# Patient Record
Sex: FEMALE | Race: WHITE | Employment: FULL TIME | URBAN - METROPOLITAN AREA
[De-identification: names, ages, dates, MRNs, and addresses within clinical notes are randomized per-mention and may not be internally consistent; named-entity substitution may affect disease eponyms.]

---

## 2018-08-21 PROBLEM — N92.1 BREAKTHROUGH BLEEDING ON BIRTH CONTROL PILLS: Status: ACTIVE | Noted: 2018-08-21

## 2018-08-21 PROBLEM — N94.10 DYSPAREUNIA, FEMALE: Status: ACTIVE | Noted: 2018-08-21

## 2018-08-21 PROCEDURE — 87660 TRICHOMONAS VAGIN DIR PROBE: CPT | Performed by: NURSE PRACTITIONER

## 2018-08-21 PROCEDURE — 87480 CANDIDA DNA DIR PROBE: CPT | Performed by: NURSE PRACTITIONER

## 2018-08-21 PROCEDURE — 87510 GARDNER VAG DNA DIR PROBE: CPT | Performed by: NURSE PRACTITIONER

## 2018-12-22 PROCEDURE — 88175 CYTOPATH C/V AUTO FLUID REDO: CPT | Performed by: NURSE PRACTITIONER

## 2019-04-22 PROCEDURE — 88175 CYTOPATH C/V AUTO FLUID REDO: CPT | Performed by: NURSE PRACTITIONER

## 2019-04-29 PROBLEM — Z87.42 HISTORY OF ABNORMAL CERVICAL PAPANICOLAOU SMEAR: Status: ACTIVE | Noted: 2019-04-29

## 2019-04-29 PROBLEM — R87.612 PAPANICOLAOU SMEAR OF CERVIX WITH LOW GRADE SQUAMOUS INTRAEPITHELIAL LESION (LGSIL): Status: ACTIVE | Noted: 2019-04-29

## 2019-04-29 PROCEDURE — 88305 TISSUE EXAM BY PATHOLOGIST: CPT | Performed by: NURSE PRACTITIONER

## 2019-12-07 PROBLEM — Z11.3 SCREEN FOR STD (SEXUALLY TRANSMITTED DISEASE): Status: ACTIVE | Noted: 2019-12-07

## 2019-12-07 PROBLEM — Z86.19 HISTORY OF CHLAMYDIA: Status: ACTIVE | Noted: 2019-12-07

## 2024-08-27 ENCOUNTER — OFFICE VISIT (OUTPATIENT)
Dept: ENDOCRINOLOGY CLINIC | Facility: CLINIC | Age: 29
End: 2024-08-27
Payer: COMMERCIAL

## 2024-08-27 VITALS
SYSTOLIC BLOOD PRESSURE: 123 MMHG | HEIGHT: 63 IN | HEART RATE: 69 BPM | BODY MASS INDEX: 35.44 KG/M2 | WEIGHT: 200 LBS | DIASTOLIC BLOOD PRESSURE: 85 MMHG

## 2024-08-27 DIAGNOSIS — L68.9 EXCESS BODY AND FACIAL HAIR: Primary | ICD-10-CM

## 2024-08-27 DIAGNOSIS — Z87.42 HISTORY OF IRREGULAR MENSTRUAL CYCLES: ICD-10-CM

## 2024-08-27 DIAGNOSIS — Z13.1 DIABETES MELLITUS SCREENING: ICD-10-CM

## 2024-08-27 DIAGNOSIS — E78.5 DYSLIPIDEMIA: ICD-10-CM

## 2024-08-27 PROCEDURE — 99204 OFFICE O/P NEW MOD 45 MIN: CPT | Performed by: INTERNAL MEDICINE

## 2024-08-27 NOTE — H&P
New Patient Evaluation - History and Physical    CONSULT - Reason for Visit:  irregular cycles, excess facial hair  Requesting Physician: Self referral    CHIEF COMPLAINT:    Chief Complaint   Patient presents with    Consult     Pt has PCOS as a teenager. Having a lot of issues with weight.         HISTORY OF PRESENT ILLNESS:   Jessica Arias is a 29 year old female who presents for evaluation of PCOS.     She was diagnosed around age 16   Presenting symptoms: Irregular cycles, excess facial gain  She was started on birth control  Cycles are regular   DLMP: mid August 2024  Still reports excess facial hair, has to shave daily  Sexual activity: not at this time      Labs in the past:   TSH normal      PAST MEDICAL HISTORY:   Past Medical History:    Chlamydial infection    KIMMIE I (cervical intraepithelial neoplasia I)    Female hirsutism    LGSIL on Pap smear of cervix       PAST SURGICAL HISTORY:   Past Surgical History:   Procedure Laterality Date    Appendectomy  2007    Colposcopy,bx cervix/endocerv curr  04/29/2019    CIN1    Cryocautery of cervix  05/13/2019       CURRENT MEDICATIONS:    Current Outpatient Medications   Medication Sig Dispense Refill    valACYclovir 1 G Oral Tab Take 1 tablet (1,000 mg total) by mouth every 12 (twelve) hours. 30 tablet 0    Drospirenone-Ethinyl Estradiol (GIANVI) 3-0.02 MG Oral Tab Take 1 tablet by mouth daily. 84 tablet 3    acyclovir 5 % External Ointment Apply 1 Application topically every 3 (three) hours. 1 Tube 3       ALLERGIES:  No Known Allergies    SOCIAL HISTORY:    Social History     Socioeconomic History    Marital status: Single   Tobacco Use    Smoking status: Never    Smokeless tobacco: Never   Vaping Use    Vaping status: Never Used   Substance and Sexual Activity    Alcohol use: Yes     Comment: social    Drug use: Never    Sexual activity: Yes     Partners: Male     Birth control/protection: Pill       FAMILY HISTORY:   Family History   Problem Relation Age  of Onset    Stroke Paternal Grandfather     Ovarian Cancer Paternal Grandmother        ASSESSMENTS:     REVIEW OF SYSTEMS:  Constitutional: Negative for: weight change, fever, fatigue, cold/heat intolerance  Eyes: Negative for:  Visual changes, proptosis, blurring  ENT: Negative for:  dysphagia, neck swelling, dysphonia  Respiratory: Negative for:  dyspnea, cough  Cardiovascular: Negative for:  chest pain, palpitations, orthopnea  GI: Negative for:  abdominal pain, nausea, vomiting, diarrhea, constipation, bleeding  Neurology: Negative for: headache, numbness, weakness  Genito-Urinary: Negative for: dysuria, frequency  Psychiatric: Negative for:  depression, anxiety  Hematology/Lymphatics: Negative for: bruising, lower extremity edema  Endocrine: Negative for: polyuria, polydypsia  Skin: Negative for: rash, blister, cellulitis,      PHYSICAL EXAM:   Vitals:    08/27/24 1553   BP: 123/85   Pulse: 69   Weight: 200 lb (90.7 kg)   Height: 5' 3\" (1.6 m)     BMI: Body mass index is 35.43 kg/m².         General Appearance:  alert, well developed, in no acute distress  Head: Atraumatic  Eyes:  normal conjunctivae, sclera., normal sclera and normal pupils  Throat/Neck: normal sound to voice. Normal hearing, normal speech, no significant thyroid enlargement palpated  Respiratory:  Speaking in full sentences, non-labored. no increased work of breathing, no audible wheezing    Neuro: motor grossly intact, moving all extremities without difficulty  Psychiatric:  oriented to time, self, and place  Extremities: no obvious extremity swelling        DATA:     Pertinent data reviewed      ASSESSMENT AND PLAN:      Patient is a 29 year old female with   Irregular cycles  Excess facial hair  PCOS is the most common hormonal disorder among young women. Its diagnosis is based on the conjunction of two out of the three following criteria: oligo- or amenorrhea, hyperandrogenism (clinical evidence of excessive androgens) or  hyperandrogenemia (biochemical evidence of excessive levels of androgens) and polycystic ovaries shown in pelvic or transvaginal ultrasound images.      - Not on any drugs implicated in hirsutism including exogenous steroids and androgens, phenytoin, minoxidil, valproate  - Other family members including mother and sisters don’t have excess body hair  - Has normal TSH levels  - Will check PRL, 7-8 am  cortisol, TT and DHEAS levels    PLAN:  1. Hirsutism  We discussed spironolactone  Side effects of therapy include gynecomastia ( which can be painful) and electrolyte imbalance (potassium) and renal dysfunction discussed.   Will start after blood work in complete  Will check CMP after 5-7 days of starting treatment and if normal, at regular intervals after that.      2. Menstrual cycles  - Will c/w birth control  - Weight loss reviewed    3. BMI > 35   She states that she has been working on lifestyle changes for many years  She counts her macros and calories  She exercises multiple days a week   We also discussed weight loss medications, wegovy was not approved by insurance  We discussed phentermine - topiramate : she will like to think about this    4. Cardio metabolic risk profile  - BP is normal  - LDL was over 100in the past, will get a fasting lipid panel    Encouraged to follow a low carb, low fat diet and exercise daily   Further management will be based on results      Orders Placed This Encounter   Procedures    Cortisol    Prolactin    Dehydroepiandrosterone Sulfate    Testosterone Total    Lipid Panel [E]    Hemoglobin A1C [E]         8/27/2024  Dolly Lewis MD        Patient verbalized a complete  understanding of all of the above and did not have any further questions.

## 2024-08-30 ENCOUNTER — TELEPHONE (OUTPATIENT)
Dept: ENDOCRINOLOGY CLINIC | Facility: CLINIC | Age: 29
End: 2024-08-30

## 2024-08-30 NOTE — TELEPHONE ENCOUNTER
Patient states she completed blood work with [facility] and it is being sent to the office and would like to speak to RN about this please call

## 2024-09-06 NOTE — TELEPHONE ENCOUNTER
Unable to locate labs results. Notified patient that we do not have the results. She is in communication with [facility] and will try to get the results sent to us.

## 2024-09-11 ENCOUNTER — PATIENT MESSAGE (OUTPATIENT)
Dept: ENDOCRINOLOGY CLINIC | Facility: CLINIC | Age: 29
End: 2024-09-11

## 2024-09-11 ENCOUNTER — TELEPHONE (OUTPATIENT)
Dept: ENDOCRINOLOGY CLINIC | Facility: CLINIC | Age: 29
End: 2024-09-11

## 2024-09-11 DIAGNOSIS — R63.5 WEIGHT GAIN: ICD-10-CM

## 2024-09-11 DIAGNOSIS — E28.2 PCOS (POLYCYSTIC OVARIAN SYNDROME): Primary | ICD-10-CM

## 2024-09-11 DIAGNOSIS — Z13.9 SCREENING FOR CONDITION: ICD-10-CM

## 2024-09-11 NOTE — TELEPHONE ENCOUNTER
From: Jessica Arias  To: Dolly Lewis  Sent: 9/11/2024 11:13 AM CDT  Subject: Test Results Follow Up    Hi there,     There seems to be a disconnect between the hospital I got my bloodwork done at and your office, as the results have not been sent over in almost 2 weeks. Attached are screenshots of the results I received through and other hospital’s portal. If you could please review the bloodwork and let me know next best steps, that would be great!     I am also looking for a referral letter for a nutritionist to help manage my PCOS, and I would need that referral for insurance to cover the cost of appointments. If you could help, that’d also be wonderful!     Thanks so much !

## 2024-09-11 NOTE — TELEPHONE ENCOUNTER
Patient is calling to request a recommendation and referral for Nutritionist for PCOS.  Please call

## 2024-09-12 RX ORDER — SPIRONOLACTONE 50 MG/1
50 TABLET, FILM COATED ORAL DAILY
Qty: 90 TABLET | Refills: 0 | Status: SHIPPED | OUTPATIENT
Start: 2024-09-12

## 2024-09-16 RX ORDER — TOPIRAMATE 25 MG/1
25 TABLET, FILM COATED ORAL DAILY
Qty: 90 TABLET | Refills: 0 | Status: SHIPPED | OUTPATIENT
Start: 2024-09-16

## 2024-09-16 RX ORDER — PHENTERMINE HYDROCHLORIDE 15 MG/1
15 CAPSULE ORAL EVERY MORNING
Qty: 90 CAPSULE | Refills: 0 | Status: SHIPPED | OUTPATIENT
Start: 2024-09-16

## 2024-10-02 ENCOUNTER — PATIENT MESSAGE (OUTPATIENT)
Dept: ENDOCRINOLOGY CLINIC | Facility: CLINIC | Age: 29
End: 2024-10-02

## 2024-10-03 NOTE — TELEPHONE ENCOUNTER
From: Jessica Arias  To: Dolly Lewis  Sent: 10/2/2024 3:14 PM CDT  Subject: Nutritionist Referral    Hi there,    Can you please provide me with a referral to see a nutritionist for my PCOS at your earliest convenience.     Thanks!

## 2024-10-09 ENCOUNTER — HOSPITAL ENCOUNTER (OUTPATIENT)
Dept: ULTRASOUND IMAGING | Age: 29
Discharge: HOME | End: 2024-10-09
Attending: INTERNAL MEDICINE
Payer: COMMERCIAL

## 2024-10-09 DIAGNOSIS — E28.2 PCOS (POLYCYSTIC OVARIAN SYNDROME): ICD-10-CM

## 2024-10-09 PROCEDURE — 76856 US EXAM PELVIC COMPLETE: CPT | Performed by: INTERNAL MEDICINE

## 2024-10-09 PROCEDURE — 76830 TRANSVAGINAL US NON-OB: CPT | Performed by: INTERNAL MEDICINE

## 2024-10-10 ENCOUNTER — PATIENT MESSAGE (OUTPATIENT)
Dept: ENDOCRINOLOGY CLINIC | Facility: CLINIC | Age: 29
End: 2024-10-10

## 2024-12-03 RX ORDER — SPIRONOLACTONE 50 MG/1
50 TABLET, FILM COATED ORAL DAILY
Qty: 90 TABLET | Refills: 0 | Status: SHIPPED | OUTPATIENT
Start: 2024-12-03

## 2024-12-03 NOTE — TELEPHONE ENCOUNTER
Endocrine Refill protocol for oral antihypertensive medications    Protocol Criteria:  FAILED  Reason: No labs completed in required time frame     If all below requirements are met, send a 90-day supply with 1 refill per provider protocol.    Verify appointment with Endocrinology completed in the last 6 months or scheduled in the next 3 months.  Verify BMP or CMP completed in the last 12 months   Verify last GFR result is greater than or equal to 50     Last completed office visit:8/27/2024 Dolly Lewis MD   Next scheduled Follow up:   Future Appointments   Date Time Provider Department Center   12/13/2024  2:15 PM Dolly Lewis MD [facility]      Last BMP or CMP completion date:No results found for: \"GFRAA\", \"GFRNAA\", \"EGFRCR\"

## 2024-12-17 ENCOUNTER — OFFICE VISIT (OUTPATIENT)
Dept: ENDOCRINOLOGY CLINIC | Facility: CLINIC | Age: 29
End: 2024-12-17

## 2024-12-17 VITALS
SYSTOLIC BLOOD PRESSURE: 113 MMHG | HEIGHT: 63 IN | HEART RATE: 78 BPM | WEIGHT: 195.38 LBS | BODY MASS INDEX: 34.62 KG/M2 | DIASTOLIC BLOOD PRESSURE: 60 MMHG

## 2024-12-17 DIAGNOSIS — E28.2 PCOS (POLYCYSTIC OVARIAN SYNDROME): ICD-10-CM

## 2024-12-17 DIAGNOSIS — L68.0 FEMALE HIRSUTISM: ICD-10-CM

## 2024-12-17 PROCEDURE — 99214 OFFICE O/P EST MOD 30 MIN: CPT | Performed by: INTERNAL MEDICINE

## 2024-12-17 RX ORDER — SPIRONOLACTONE 50 MG/1
50 TABLET, FILM COATED ORAL DAILY
Qty: 90 TABLET | Refills: 0 | Status: SHIPPED | OUTPATIENT
Start: 2024-12-17

## 2024-12-17 RX ORDER — METFORMIN HYDROCHLORIDE 500 MG/1
TABLET, EXTENDED RELEASE ORAL
Qty: 53 TABLET | Refills: 0 | Status: SHIPPED | OUTPATIENT
Start: 2024-12-17 | End: 2025-01-16

## 2024-12-17 RX ORDER — PHENTERMINE HYDROCHLORIDE 15 MG/1
15 CAPSULE ORAL EVERY MORNING
Qty: 90 CAPSULE | Refills: 0 | Status: CANCELLED | OUTPATIENT
Start: 2024-12-17

## 2024-12-17 NOTE — PROGRESS NOTES
FU VISIT     CHIEF COMPLAINT:    Elevated BMI   Excess facial hair  PCOS    HISTORY OF PRESENT ILLNESS:   Jessica Arias is a 29 year old female who presents for evaluation of dyslipidemia, excess facial hair and elevated BMI     She was diagnosed around age 16   Presenting symptoms: Irregular cycles, excess facial hair  She was started on birth control  Cycles are regular now  Still reports excess facial hair, has to shave daily--> Started on SPRL , helps some, if BMP is normal will like to go up on the dose   Sexual activity: not at this time      Labs in the past:   TSH normal    She started phentermine but has not been taking the topamax, was concerned about SE  She does not think this has helped  her loose weight   She continues to follow a healthy lifestyle      PAST MEDICAL HISTORY:   Past Medical History:    Chlamydial infection    KIMMIE I (cervical intraepithelial neoplasia I)    Female hirsutism    LGSIL on Pap smear of cervix       PAST SURGICAL HISTORY:   Past Surgical History:   Procedure Laterality Date    Appendectomy  2007    Colposcopy,bx cervix/endocerv curr  04/29/2019    CIN1    Cryocautery of cervix  05/13/2019       CURRENT MEDICATIONS:    Current Outpatient Medications   Medication Sig Dispense Refill    spironolactone 50 MG Oral Tab Take 1 tablet (50 mg total) by mouth daily. 90 tablet 0    metFORMIN  MG Oral Tablet 24 Hr Take 1 tablet (500 mg total) by mouth daily with breakfast for 7 days, THEN 1 tablet (500 mg total) 2 (two) times daily with meals for 23 days. 53 tablet 0    valACYclovir 1 G Oral Tab Take 1 tablet (1,000 mg total) by mouth every 12 (twelve) hours. 30 tablet 0    Drospirenone-Ethinyl Estradiol (GIANVI) 3-0.02 MG Oral Tab Take 1 tablet by mouth daily. 84 tablet 3    acyclovir 5 % External Ointment Apply 1 Application topically every 3 (three) hours. 1 Tube 3       ALLERGIES:  No Known Allergies    SOCIAL HISTORY:    Social History     Socioeconomic History    Marital  status: Single   Tobacco Use    Smoking status: Never    Smokeless tobacco: Never   Vaping Use    Vaping status: Never Used   Substance and Sexual Activity    Alcohol use: Yes     Comment: social    Drug use: Never    Sexual activity: Yes     Partners: Male     Birth control/protection: Pill       FAMILY HISTORY:   Family History   Problem Relation Age of Onset    Stroke Paternal Grandfather     Ovarian Cancer Paternal Grandmother        ASSESSMENTS:     REVIEW OF SYSTEMS:  Constitutional: Negative for: fever, fatigue, cold/heat intolerance, + weight loss intentional  Eyes: Negative for:  Visual changes, proptosis, blurring  ENT: Negative for:  dysphagia, neck swelling, dysphonia  Respiratory: Negative for:  dyspnea, cough  Cardiovascular: Negative for:  chest pain, palpitations, orthopnea  GI: Negative for:  abdominal pain, nausea, vomiting, diarrhea, constipation, bleeding  Neurology: Negative for: headache, numbness, weakness  Genito-Urinary: Negative for: dysuria, frequency  Psychiatric: Negative for:  depression, anxiety  Hematology/Lymphatics: Negative for: bruising, lower extremity edema  Endocrine: Negative for: polyuria, polydypsia  Skin: Negative for: rash, blister, cellulitis,      PHYSICAL EXAM:   Vitals:    12/17/24 1426   BP: 113/60   Pulse: 78   Weight: 195 lb 6.4 oz (88.6 kg)   Height: 5' 3\" (1.6 m)       BMI: Body mass index is 34.61 kg/m².         General Appearance:  alert, well developed, in no acute distress  Head: Atraumatic  Eyes:  normal conjunctivae, sclera., normal sclera and normal pupils  Throat/Neck: normal sound to voice. Normal hearing, normal speech  Respiratory:  Speaking in full sentences, non-labored. no increased work of breathing, no audible wheezing    Neuro: motor grossly intact, moving all extremities without difficulty  Psychiatric:  oriented to time, self, and place      DATA:     Pertinent data reviewed      ASSESSMENT AND PLAN:      Patient is a 29 year old female with    Irregular cycles  Excess facial hair  PCOS is the most common hormonal disorder among young women. Its diagnosis is based on the conjunction of two out of the three following criteria: oligo- or amenorrhea, hyperandrogenism (clinical evidence of excessive androgens) or hyperandrogenemia (biochemical evidence of excessive levels of androgens) and polycystic ovaries shown in pelvic or transvaginal ultrasound images.      - Not on any drugs implicated in hirsutism including exogenous steroids and androgens, phenytoin, minoxidil, valproate  - Other family members including mother and sisters don’t have excess body hair  - normal TSH , PRL  TT and DHEAS levels    She got cortisol levels drawn this morning, will let her know once I get the results    PLAN:  1. Hirsutism  Spironolactone 50 mg daily   Side effects of therapy include gynecomastia ( which can be painful) and electrolyte imbalance (potassium) and renal dysfunction discussed.   Complete BMP       2. Menstrual cycles  - Will c/w birth control  - Weight loss reviewed    3. BMI 30-35  C/w lifestyle changes  Medications:   wegovy was not approved by insurance  She has t/f  phentermine  I had a long discussion with the patient : I recommend evaluation by a weight loss specialist  Referral information for Dr Burns provided     4. Cardio metabolic risk profile  - BP is normal  - LDL was over 100 in the past, better on recent testing     Encouraged to  continue to follow a low carb, low fat diet and exercise daily       Patient expressed frustration about not feeling good in terms of weight, excess hair ; I provided support and encouragement. Discussed that she can try MTF to see if that helps with over all improvement. She will think about this , but will like a prescription sent x one month Reviewed medication including GI SE, take with food.  She will also like to consider going up on SPRL once BMP results are available  I also discussed talking to Dr Burns  regarding weight management               Dolly Lewis MD        Patient verbalized a complete  understanding of all of the above and did not have any further questions.      A total of 35 minutes was spent on obtaining history, reviewing pertinent labs, evaluating patient, providing multiple treatment options, reinforcing diet/exercise and compliance, and completing documentation.

## 2024-12-18 ENCOUNTER — TELEPHONE (OUTPATIENT)
Dept: ENDOCRINOLOGY CLINIC | Facility: CLINIC | Age: 29
End: 2024-12-18

## 2024-12-18 DIAGNOSIS — R79.89 HIGH SERUM CORTISOL: Primary | ICD-10-CM

## 2024-12-18 LAB
BUN: 12 MG/DL (ref 7–25)
CALCIUM: 9.2 MG/DL (ref 8.6–10.2)
CARBON DIOXIDE: 24 MMOL/L (ref 20–32)
CHLORIDE: 105 MMOL/L (ref 98–110)
CORTISOL, TOTAL: 25.8 MCG/DL
CREATININE: 0.96 MG/DL (ref 0.5–0.96)
EGFR: 82 ML/MIN/1.73M2
GLUCOSE: 84 MG/DL (ref 65–99)
POTASSIUM: 4.1 MMOL/L (ref 3.5–5.3)
SODIUM: 138 MMOL/L (ref 135–146)

## 2024-12-27 ENCOUNTER — TELEPHONE (OUTPATIENT)
Dept: ENDOCRINOLOGY CLINIC | Facility: CLINIC | Age: 29
End: 2024-12-27

## 2025-02-28 RX ORDER — SPIRONOLACTONE 50 MG/1
50 TABLET, FILM COATED ORAL DAILY
Qty: 90 TABLET | Refills: 0 | Status: SHIPPED | OUTPATIENT
Start: 2025-02-28

## 2025-02-28 NOTE — TELEPHONE ENCOUNTER
Endocrine Refill protocol for oral antihypertensive medications    Protocol Criteria:  PASSED  Reason: N/A     If all below requirements are met, send a 90-day supply with 1 refill per provider protocol.    Verify appointment with Endocrinology completed in the last 6 months or scheduled in the next 3 months.  Verify BMP or CMP completed in the last 12 months   Verify last GFR result is greater than or equal to 50     Last completed office visit:12/17/2024 Dolly Lewis MD   Next scheduled Follow up: No future appointments.   Last BMP or CMP completion date:  Lab Results   Component Value Date    EGFRCR 82 12/17/2024

## 2025-05-25 ENCOUNTER — PATIENT MESSAGE (OUTPATIENT)
Dept: ENDOCRINOLOGY CLINIC | Facility: CLINIC | Age: 30
End: 2025-05-25

## 2025-05-25 RX ORDER — DROSPIRENONE AND ETHINYL ESTRADIOL 0.02-3(28)
1 KIT ORAL DAILY
Qty: 84 TABLET | Refills: 0 | Status: SHIPPED | OUTPATIENT
Start: 2025-05-25 | End: 2026-05-25

## 2025-05-25 RX ORDER — SPIRONOLACTONE 50 MG/1
50 TABLET, FILM COATED ORAL DAILY
Qty: 90 TABLET | Refills: 0 | Status: SHIPPED | OUTPATIENT
Start: 2025-05-25

## 2025-08-26 RX ORDER — SPIRONOLACTONE 50 MG/1
50 TABLET, FILM COATED ORAL DAILY
Qty: 90 TABLET | Refills: 0 | Status: SHIPPED | OUTPATIENT
Start: 2025-08-26